# Patient Record
Sex: MALE | Race: WHITE | NOT HISPANIC OR LATINO | URBAN - METROPOLITAN AREA
[De-identification: names, ages, dates, MRNs, and addresses within clinical notes are randomized per-mention and may not be internally consistent; named-entity substitution may affect disease eponyms.]

---

## 2017-03-30 ENCOUNTER — IMPORTED ENCOUNTER (OUTPATIENT)
Dept: URBAN - METROPOLITAN AREA CLINIC 50 | Facility: CLINIC | Age: 47
End: 2017-03-30

## 2019-04-11 ENCOUNTER — IMPORTED ENCOUNTER (OUTPATIENT)
Dept: URBAN - METROPOLITAN AREA CLINIC 50 | Facility: CLINIC | Age: 49
End: 2019-04-11

## 2019-04-11 NOTE — PATIENT DISCUSSION
"""Discussed. Will monitor. Patient doesn't qualify for cataract surgery and he isn't bothered.  Will ""

## 2019-10-14 NOTE — PATIENT DISCUSSION
Patient given Rx for glasses.  ed can try PAL/Office lens again but may have adaptation issues due to not much need for dist Rx.

## 2021-04-17 ASSESSMENT — VISUAL ACUITY
OS_SC: 20/20
OD_CC: J2@ 18 IN
OS_SC: 20/25
OS_CC: J2@ 18 IN
OD_SC: 20/20
OD_SC: 20/20

## 2021-04-17 ASSESSMENT — TONOMETRY
OS_IOP_MMHG: 14
OD_IOP_MMHG: 12
OS_IOP_MMHG: 13
OD_IOP_MMHG: 13

## 2021-11-09 NOTE — PATIENT DISCUSSION
11/9/2021:  NO previous CTL wear.  may CB to set up I and R with K[facility] so I will order CTL today.  pt does well today in office with PLAT.

## 2021-12-06 NOTE — PATIENT DISCUSSION
12/6/21: pt can successfully insert and remove CL today in office. [employer] kit given today along with proper CTL wear & instructions.